# Patient Record
Sex: FEMALE | Race: WHITE | NOT HISPANIC OR LATINO | Employment: UNEMPLOYED | URBAN - METROPOLITAN AREA
[De-identification: names, ages, dates, MRNs, and addresses within clinical notes are randomized per-mention and may not be internally consistent; named-entity substitution may affect disease eponyms.]

---

## 2023-03-30 DIAGNOSIS — E78.1 HYPERTRIGLYCERIDEMIA: ICD-10-CM

## 2023-03-30 PROBLEM — R60.9 EDEMA: Status: ACTIVE | Noted: 2023-03-30

## 2023-03-30 PROBLEM — R53.83 FATIGUE: Status: ACTIVE | Noted: 2023-03-30

## 2023-03-30 PROBLEM — R03.0 ELEVATED BLOOD PRESSURE READING WITHOUT DIAGNOSIS OF HYPERTENSION: Status: ACTIVE | Noted: 2023-03-30

## 2023-03-30 PROBLEM — G45.9 TIA (TRANSIENT ISCHEMIC ATTACK): Status: ACTIVE | Noted: 2023-03-30

## 2023-03-30 RX ORDER — OMEGA-3S/DHA/EPA/FISH OIL/D3 360MG-1000
1 CAPSULE ORAL DAILY
COMMUNITY
Start: 2014-11-18

## 2023-03-30 RX ORDER — ATORVASTATIN CALCIUM 40 MG/1
40 TABLET, FILM COATED ORAL DAILY
Qty: 90 TABLET | Refills: 0 | Status: SHIPPED | OUTPATIENT
Start: 2023-03-30 | End: 2023-08-16 | Stop reason: SDUPTHER

## 2023-03-30 RX ORDER — ATORVASTATIN CALCIUM 40 MG/1
1 TABLET, FILM COATED ORAL DAILY
COMMUNITY
Start: 2014-11-18 | End: 2023-03-30 | Stop reason: SDUPTHER

## 2023-03-30 RX ORDER — MULTIVIT-MIN/IRON/FOLIC ACID/K 18-600-40
1 CAPSULE ORAL DAILY
COMMUNITY
Start: 2014-11-18

## 2023-03-30 RX ORDER — LORATADINE 10 MG/1
1 TABLET ORAL DAILY
COMMUNITY
Start: 2014-11-18

## 2023-07-07 ENCOUNTER — TELEPHONE (OUTPATIENT)
Dept: PRIMARY CARE | Facility: CLINIC | Age: 63
End: 2023-07-07
Payer: COMMERCIAL

## 2023-07-07 DIAGNOSIS — R53.83 OTHER FATIGUE: ICD-10-CM

## 2023-07-07 DIAGNOSIS — Z00.00 HEALTHCARE MAINTENANCE: ICD-10-CM

## 2023-08-07 ENCOUNTER — OFFICE VISIT (OUTPATIENT)
Dept: PRIMARY CARE | Facility: CLINIC | Age: 63
End: 2023-08-07
Payer: COMMERCIAL

## 2023-08-07 VITALS
OXYGEN SATURATION: 98 % | HEART RATE: 56 BPM | HEIGHT: 68 IN | BODY MASS INDEX: 29.86 KG/M2 | SYSTOLIC BLOOD PRESSURE: 135 MMHG | DIASTOLIC BLOOD PRESSURE: 78 MMHG | WEIGHT: 197 LBS

## 2023-08-07 DIAGNOSIS — J01.10 ACUTE NON-RECURRENT FRONTAL SINUSITIS: Primary | ICD-10-CM

## 2023-08-07 PROCEDURE — 1036F TOBACCO NON-USER: CPT | Performed by: NURSE PRACTITIONER

## 2023-08-07 PROCEDURE — 99213 OFFICE O/P EST LOW 20 MIN: CPT | Performed by: NURSE PRACTITIONER

## 2023-08-07 RX ORDER — AMOXICILLIN AND CLAVULANATE POTASSIUM 875; 125 MG/1; MG/1
875 TABLET, FILM COATED ORAL 2 TIMES DAILY
Qty: 20 TABLET | Refills: 0 | Status: SHIPPED | OUTPATIENT
Start: 2023-08-07 | End: 2023-08-16 | Stop reason: ALTCHOICE

## 2023-08-07 RX ORDER — BENZONATATE 200 MG/1
200 CAPSULE ORAL 3 TIMES DAILY PRN
Qty: 42 CAPSULE | Refills: 0 | Status: SHIPPED | OUTPATIENT
Start: 2023-08-07 | End: 2023-09-06

## 2023-08-07 RX ORDER — CALCIUM CARBONATE 500(1250)
1 TABLET ORAL
COMMUNITY

## 2023-08-07 RX ORDER — FLUTICASONE PROPIONATE 50 MCG
1 SPRAY, SUSPENSION (ML) NASAL DAILY
Qty: 16 G | Refills: 0 | Status: SHIPPED | OUTPATIENT
Start: 2023-08-07

## 2023-08-07 ASSESSMENT — ENCOUNTER SYMPTOMS
NAUSEA: 0
DIARRHEA: 0
FEVER: 0
FATIGUE: 0
VOMITING: 0
SINUS PAIN: 0
RHINORRHEA: 1
CHILLS: 0
SHORTNESS OF BREATH: 0
WHEEZING: 0
COUGH: 1
ABDOMINAL PAIN: 0
PALPITATIONS: 0
SINUS PRESSURE: 0
SORE THROAT: 1

## 2023-08-07 NOTE — ASSESSMENT & PLAN NOTE
Antibiotic sent to pharmacy  Advised patient to push fluids and start use of cool mist humidifier  Patient to start using flonase nasal spray  Tessalon perles sent to pharmacy   Patient to call if develop new or worsening symptoms

## 2023-08-07 NOTE — PROGRESS NOTES
"Subjective   Chief Complaint: Sore Throat.    HPI   Cassidy Nova is a 63 y.o. female who presents for Sore Throat.    Patient presents with 10 day history of cough and congestion,   This morning noticed white spots on the back of her throat and reports sore throat.   Cough is worse at nighttime when laying down    Patient denies fever, chills, nausea, vomiting, diarrhea, chest pain, heart palpations, or shortness of breath.     OTC has been using mucinex         Review of Systems   Constitutional:  Negative for chills, fatigue and fever.   HENT:  Positive for congestion, postnasal drip, rhinorrhea and sore throat. Negative for ear discharge, ear pain, nosebleeds, sinus pressure and sinus pain.    Respiratory:  Positive for cough. Negative for shortness of breath and wheezing.    Cardiovascular:  Negative for chest pain and palpitations.   Gastrointestinal:  Negative for abdominal pain, diarrhea, nausea and vomiting.       Objective   /78   Pulse 56   Ht 1.715 m (5' 7.5\")   Wt 89.4 kg (197 lb)   SpO2 98%   BMI 30.40 kg/m²   BSA Body surface area is 2.06 meters squared.      Physical Exam  Constitutional:       Appearance: Normal appearance.   HENT:      Right Ear: Tympanic membrane normal.      Left Ear: Tympanic membrane normal.      Mouth/Throat:      Mouth: Mucous membranes are moist.   Eyes:      Pupils: Pupils are equal, round, and reactive to light.   Cardiovascular:      Rate and Rhythm: Normal rate and regular rhythm.   Pulmonary:      Effort: Pulmonary effort is normal.      Breath sounds: Normal breath sounds.   Abdominal:      General: Abdomen is flat.      Palpations: Abdomen is soft.   Neurological:      Mental Status: She is alert.       No visits with results within 1 Year(s) from this visit.   Latest known visit with results is:   Legacy Encounter on 05/30/2018   Component Date Value Ref Range Status    Glucose 05/30/2018 103 (H)  74 - 99 mg/dL Final    Sodium 05/30/2018 140  136 - 145 " mmol/L Final    Potassium 05/30/2018 4.9  3.5 - 5.3 mmol/L Final    Chloride 05/30/2018 104  98 - 107 mmol/L Final    Bicarbonate 05/30/2018 24  21 - 32 mmol/L Final    Anion Gap 05/30/2018 17  10 - 20 mmol/L Final    Urea Nitrogen 05/30/2018 17  6 - 23 mg/dL Final    Creatinine 05/30/2018 0.83  0.50 - 1.05 mg/dL Final    GLOMERULAR FILTRATION RATE-NON AFR* 05/30/2018 >60  >60 mL/min/1.73m2 Final    GLOMERULAR FILTRATION RATE-* 05/30/2018 >60  >60 mL/min/1.73m2 Final    Comment:  CALCULATIONS OF ESTIMATED GFR ARE PERFORMED   USING THE MDRD STUDY EQUATION FOR THE   IDMS-TRACEABLE CREATININE METHODS.   CLIN CHEM 2007;53:766-72      Calcium 05/30/2018 10.0  8.6 - 10.6 mg/dL Final    Albumin 05/30/2018 4.7  3.4 - 5.0 g/dL Final    Alkaline Phosphatase 05/30/2018 77  33 - 110 U/L Final    Total Protein 05/30/2018 7.5  6.4 - 8.2 g/dL Final    AST 05/30/2018 24  9 - 39 U/L Final    Total Bilirubin 05/30/2018 1.5 (H)  0.0 - 1.2 mg/dL Final    ALT (SGPT) 05/30/2018 36  7 - 45 U/L Final    Comment:  Patients treated with Sulfasalazine may generate    falsely decreased results for ALT.      TSH 05/30/2018 2.23  0.44 - 3.98 mIU/L Final    Comment:  TSH testing is performed using different testing    methodology at [facility] than at other    [facility] hospitals. Direct result comparisons should    only be made within the same method.  .   Patients receiving more than 5 mg/day of biotin may have interference   in test results.  A sample should be taken no sooner than eight hours   after  previous dose. Contact [phone] for additional information.      Vitamin D, 25-Hydroxy 05/30/2018 35  ng/mL Final    Comment: .  DEFICIENCY:         < 20  NG/ML  INSUFFICIENCY:      20-29 NG/ML  OPTIMUM LEVEL:      30-80 NG/ML  POSSIBLE TOXICITY:  > 80  NG/ML    THIS ASSAY ACCURATELY QUANTIFIES THE SUM OF  VITAMIN D3, 25-HYDROXY AND VIT D2,25-HYDROXY.      WBC 05/30/2018 6.4  4.4 - 11.3 x10E9/L Final    nRBC 05/30/2018 0.0   0.0 - 0.0 /100 WBC Final    RBC 05/30/2018 4.50  4.00 - 5.20 x10E12/L Final    Hemoglobin 05/30/2018 13.8  12.0 - 16.0 g/dL Final    Hematocrit 05/30/2018 42.2  36.0 - 46.0 % Final    MCV 05/30/2018 94  80 - 100 fL Final    MCHC 05/30/2018 32.7  32.0 - 36.0 g/dL Final    Platelets 05/30/2018 215  150 - 450 x10E9/L Final    RDW 05/30/2018 12.9  11.5 - 14.5 % Final    Neutrophils % 05/30/2018 53.6  40.0 - 80.0 % Final    Immature Granulocytes %, Automated 05/30/2018 0.5  0.0 - 0.9 % Final    Comment:  Percent differential counts (%) should be interpreted in the   context of the absolute cell counts (cells/L).      Lymphocytes % 05/30/2018 34.3  13.0 - 44.0 % Final    Monocytes % 05/30/2018 7.7  2.0 - 10.0 % Final    Eosinophils % 05/30/2018 3.0  0.0 - 6.0 % Final    Basophils % 05/30/2018 0.9  0.0 - 2.0 % Final    Neutrophils Absolute 05/30/2018 3.42  1.20 - 7.70 x10E9/L Final    Lymphocytes Absolute 05/30/2018 2.19  1.20 - 4.80 x10E9/L Final    Monocytes Absolute 05/30/2018 0.49  0.10 - 1.00 x10E9/L Final    Eosinophils Absolute 05/30/2018 0.19  0.00 - 0.70 x10E9/L Final    Basophils Absolute 05/30/2018 0.06  0.00 - 0.10 x10E9/L Final    Cholesterol 05/30/2018 172  0 - 199 mg/dL Final    Comment: .      AGE      DESIRABLE   BORDERLINE HIGH   HIGH     0-19 Y     0 - 169       170 - 199     >/= 200    20-24 Y     0 - 189       190 - 224     >/= 225         >24 Y     0 - 199       200 - 239     >/= 240   **All ranges are based on fasting samples. Specific   therapeutic targets will vary based on patient-specific   cardiac risk.  .   Pediatric guidelines reference:Pediatrics 2011, 128(S5).   Adult guidelines reference: NCEP ATPIII Guidelines,     CHIRAG 2001, 258:2486-97  .   Venipuncture immediately after or during the    administration of Metamizole may lead to falsely   low results. Testing should be performed immediately   prior to Metamizole dosing.      HDL 05/30/2018 48.7  mg/dL Final    Comment: .      AGE       VERY LOW   LOW     NORMAL    HIGH       0-19 Y       < 35   < 40     40-45     ----    20-24 Y       ----   < 40       >45     ----      >24 Y       ----   < 40     40-60      >60  .      Cholesterol/HDL Ratio 05/30/2018 3.5   Final    Comment: REF VALUES  DESIRABLE  < 3.4  HIGH RISK  > 5.0      LDL 05/30/2018 82  0 - 99 mg/dL Final    Comment: .                           NEAR      BORD      AGE      DESIRABLE  OPTIMAL    HIGH     HIGH     VERY HIGH     0-19 Y     0 - 109     ---    110-129   >/= 130     ----    20-24 Y     0 - 119     ---    120-159   >/= 160     ----      >24 Y     0 -  99   100-129  130-159   160-189     >/=190  .      VLDL 05/30/2018 42 (H)  0 - 40 mg/dL Final    Triglycerides 05/30/2018 208 (H)  0 - 149 mg/dL Final    Comment: .      AGE      DESIRABLE   BORDERLINE HIGH   HIGH     VERY HIGH   0 D-90 D    19 - 174         ----         ----        ----  91 D- 9 Y     0 -  74        75 -  99     >/= 100      ----    10-19 Y     0 -  89        90 - 129     >/= 130      ----    20-24 Y     0 - 114       115 - 149     >/= 150      ----         >24 Y     0 - 149       150 - 199    200- 499    >/= 500  .   Venipuncture immediately after or during the    administration of Metamizole may lead to falsely   low results. Testing should be performed immediately   prior to Metamizole dosing.      Non HDL Cholesterol 05/30/2018 123  mg/dL Final    Comment:     AGE      DESIRABLE   BORDERLINE HIGH   HIGH     VERY HIGH     0-19 Y     0 - 119       120 - 144     >/= 145    >/= 160    20-24 Y     0 - 149       150 - 189     >/= 190      ----         >24 Y    30 MG/DL ABOVE LDL CHOLESTEROL GOAL  .       Current Outpatient Medications on File Prior to Visit   Medication Sig Dispense Refill    aspirin-calcium carbonate 81 mg-300 mg calcium(777 mg) tablet Take 1 tablet by mouth once daily.      atorvastatin (Lipitor) 40 mg tablet Take 1 tablet (40 mg) by mouth once daily. 90 tablet 0    calcium carbonate (Oscal) 500  mg calcium (1,250 mg) tablet Take 1 tablet (1,250 mg) by mouth 2 times a day with meals.      loratadine (Claritin) 10 mg tablet Take 1 tablet (10 mg) by mouth once daily.      multivitamin-min-iron-FA-vit K (Multi For Her) 18 mg iron-600 mcg-40 mcg capsule Take 1 capsule by mouth once daily.      omega-3s-dha-epa-fish oil-D3 (Fish Oil-Vit D3) 360 mg-1,200 mg -1,000 unit capsule Take 1 capsule by mouth once daily.       No current facility-administered medications on file prior to visit.     No images are attached to the encounter.            Assessment/Plan   Problem List Items Addressed This Visit       Acute non-recurrent frontal sinusitis - Primary     Antibiotic sent to pharmacy  Advised patient to push fluids and start use of cool mist humidifier  Patient to start using flonase nasal spray  Tessalon perles sent to pharmacy   Patient to call if develop new or worsening symptoms          Relevant Medications    amoxicillin-pot clavulanate (Augmentin) 875-125 mg tablet    fluticasone (Flonase) 50 mcg/actuation nasal spray    benzonatate (Tessalon) 200 mg capsule

## 2023-08-16 ENCOUNTER — OFFICE VISIT (OUTPATIENT)
Dept: PRIMARY CARE | Facility: CLINIC | Age: 63
End: 2023-08-16
Payer: COMMERCIAL

## 2023-08-16 VITALS
BODY MASS INDEX: 29.83 KG/M2 | WEIGHT: 196.8 LBS | DIASTOLIC BLOOD PRESSURE: 74 MMHG | HEIGHT: 68 IN | HEART RATE: 61 BPM | SYSTOLIC BLOOD PRESSURE: 128 MMHG

## 2023-08-16 DIAGNOSIS — R53.83 OTHER FATIGUE: ICD-10-CM

## 2023-08-16 DIAGNOSIS — R03.0 ELEVATED BLOOD PRESSURE READING WITHOUT DIAGNOSIS OF HYPERTENSION: ICD-10-CM

## 2023-08-16 DIAGNOSIS — Z00.00 HEALTHCARE MAINTENANCE: Primary | ICD-10-CM

## 2023-08-16 DIAGNOSIS — Z12.31 ENCOUNTER FOR SCREENING MAMMOGRAM FOR MALIGNANT NEOPLASM OF BREAST: ICD-10-CM

## 2023-08-16 DIAGNOSIS — E78.1 HYPERTRIGLYCERIDEMIA: ICD-10-CM

## 2023-08-16 DIAGNOSIS — G45.9 TIA (TRANSIENT ISCHEMIC ATTACK): ICD-10-CM

## 2023-08-16 PROCEDURE — 99396 PREV VISIT EST AGE 40-64: CPT | Performed by: FAMILY MEDICINE

## 2023-08-16 PROCEDURE — 1036F TOBACCO NON-USER: CPT | Performed by: FAMILY MEDICINE

## 2023-08-16 RX ORDER — ATORVASTATIN CALCIUM 40 MG/1
40 TABLET, FILM COATED ORAL DAILY
Qty: 90 TABLET | Refills: 3 | Status: SHIPPED | OUTPATIENT
Start: 2023-08-16 | End: 2023-10-06

## 2023-08-16 RX ORDER — ASPIRIN 81 MG/1
81 TABLET ORAL DAILY
COMMUNITY

## 2023-08-16 ASSESSMENT — ENCOUNTER SYMPTOMS
DIZZINESS: 0
HEADACHES: 0
ARTHRALGIAS: 0
FACIAL ASYMMETRY: 0
FEVER: 0
FATIGUE: 0
LIGHT-HEADEDNESS: 0
CHEST TIGHTNESS: 0
BACK PAIN: 0
CHOKING: 0
COLOR CHANGE: 0
PALPITATIONS: 0
COUGH: 0
APPETITE CHANGE: 0
ACTIVITY CHANGE: 0

## 2023-08-16 NOTE — PROGRESS NOTES
"Subjective   Patient ID: Cassidy Nova is a 63 y.o. female who presents for Annual Exam.    HPI   Patient presents for physical exam no pap until .     Fam Hx  Mom (62) , COPD,   Dad (85) living, Alzheimer's, DMII, bradycardia     Exercise walks, rides a bike 3 days a week  ETOH denies  Caffeine 1-2 tea/day, 12 oz coffee/day  Tobacco denies     Mammogram due   DEXA @ 67  Colonoscopy Dr. Moralez 2013 due      Past Med Hx  TIA      Past Surg Hx  cholecystectomy  tonsillectomy  cyst on back      Patient denies other complaints.  Review of Systems   Constitutional:  Negative for activity change, appetite change, fatigue and fever.   HENT:  Negative for congestion.    Respiratory:  Negative for cough, choking and chest tightness.    Cardiovascular:  Negative for chest pain, palpitations and leg swelling.   Musculoskeletal:  Negative for arthralgias, back pain and gait problem.   Skin:  Negative for color change and pallor.   Neurological:  Negative for dizziness, facial asymmetry, light-headedness and headaches.       Objective   /82 (BP Location: Left arm)   Pulse 61   Ht 1.715 m (5' 7.5\")   Wt 89.3 kg (196 lb 12.8 oz)   BMI 30.37 kg/m²   BSA Body surface area is 2.06 meters squared.      Physical Exam  Constitutional:       General: She is not in acute distress.     Appearance: Normal appearance. She is not toxic-appearing.   HENT:      Head: Normocephalic.      Right Ear: Tympanic membrane, ear canal and external ear normal.      Left Ear: Tympanic membrane, ear canal and external ear normal.      Nose: Nose normal.      Mouth/Throat:      Pharynx: Oropharynx is clear.   Eyes:      Conjunctiva/sclera: Conjunctivae normal.      Pupils: Pupils are equal, round, and reactive to light.   Cardiovascular:      Rate and Rhythm: Normal rate and regular rhythm.      Pulses: Normal pulses.      Heart sounds: Normal heart sounds.   Pulmonary:      Effort: No respiratory distress.      Breath " sounds: No wheezing, rhonchi or rales.   Abdominal:      General: Bowel sounds are normal. There is no distension.      Palpations: Abdomen is soft.      Tenderness: There is no abdominal tenderness.   Musculoskeletal:         General: No swelling or tenderness.      Cervical back: No tenderness.   Skin:     Findings: No lesion or rash.   Neurological:      General: No focal deficit present.      Mental Status: She is alert and oriented to person, place, and time. Mental status is at baseline.      Gait: Gait normal.   Psychiatric:         Mood and Affect: Mood normal.         Behavior: Behavior normal.         Thought Content: Thought content normal.         Judgment: Judgment normal.       No visits with results within 1 Year(s) from this visit.   Latest known visit with results is:   Legacy Encounter on 05/30/2018   Component Date Value Ref Range Status    Glucose 05/30/2018 103 (H)  74 - 99 mg/dL Final    Sodium 05/30/2018 140  136 - 145 mmol/L Final    Potassium 05/30/2018 4.9  3.5 - 5.3 mmol/L Final    Chloride 05/30/2018 104  98 - 107 mmol/L Final    Bicarbonate 05/30/2018 24  21 - 32 mmol/L Final    Anion Gap 05/30/2018 17  10 - 20 mmol/L Final    Urea Nitrogen 05/30/2018 17  6 - 23 mg/dL Final    Creatinine 05/30/2018 0.83  0.50 - 1.05 mg/dL Final    GLOMERULAR FILTRATION RATE-NON AFR* 05/30/2018 >60  >60 mL/min/1.73m2 Final    GLOMERULAR FILTRATION RATE-* 05/30/2018 >60  >60 mL/min/1.73m2 Final    Comment:  CALCULATIONS OF ESTIMATED GFR ARE PERFORMED   USING THE MDRD STUDY EQUATION FOR THE   IDMS-TRACEABLE CREATININE METHODS.   CLIN CHEM 2007;53:766-72      Calcium 05/30/2018 10.0  8.6 - 10.6 mg/dL Final    Albumin 05/30/2018 4.7  3.4 - 5.0 g/dL Final    Alkaline Phosphatase 05/30/2018 77  33 - 110 U/L Final    Total Protein 05/30/2018 7.5  6.4 - 8.2 g/dL Final    AST 05/30/2018 24  9 - 39 U/L Final    Total Bilirubin 05/30/2018 1.5 (H)  0.0 - 1.2 mg/dL Final    ALT (SGPT) 05/30/2018 36  7 - 45 U/L  Final    Comment:  Patients treated with Sulfasalazine may generate    falsely decreased results for ALT.      TSH 05/30/2018 2.23  0.44 - 3.98 mIU/L Final    Comment:  TSH testing is performed using different testing    methodology at [facility] than at other    [facility]. Direct result comparisons should    only be made within the same method.  .   Patients receiving more than 5 mg/day of biotin may have interference   in test results.  A sample should be taken no sooner than eight hours   after  previous dose. Contact [phone] for additional information.      Vitamin D, 25-Hydroxy 05/30/2018 35  ng/mL Final    Comment: .  DEFICIENCY:         < 20  NG/ML  INSUFFICIENCY:      20-29 NG/ML  OPTIMUM LEVEL:      30-80 NG/ML  POSSIBLE TOXICITY:  > 80  NG/ML    THIS ASSAY ACCURATELY QUANTIFIES THE SUM OF  VITAMIN D3, 25-HYDROXY AND VIT D2,25-HYDROXY.      WBC 05/30/2018 6.4  4.4 - 11.3 x10E9/L Final    nRBC 05/30/2018 0.0  0.0 - 0.0 /100 WBC Final    RBC 05/30/2018 4.50  4.00 - 5.20 x10E12/L Final    Hemoglobin 05/30/2018 13.8  12.0 - 16.0 g/dL Final    Hematocrit 05/30/2018 42.2  36.0 - 46.0 % Final    MCV 05/30/2018 94  80 - 100 fL Final    MCHC 05/30/2018 32.7  32.0 - 36.0 g/dL Final    Platelets 05/30/2018 215  150 - 450 x10E9/L Final    RDW 05/30/2018 12.9  11.5 - 14.5 % Final    Neutrophils % 05/30/2018 53.6  40.0 - 80.0 % Final    Immature Granulocytes %, Automated 05/30/2018 0.5  0.0 - 0.9 % Final    Comment:  Percent differential counts (%) should be interpreted in the   context of the absolute cell counts (cells/L).      Lymphocytes % 05/30/2018 34.3  13.0 - 44.0 % Final    Monocytes % 05/30/2018 7.7  2.0 - 10.0 % Final    Eosinophils % 05/30/2018 3.0  0.0 - 6.0 % Final    Basophils % 05/30/2018 0.9  0.0 - 2.0 % Final    Neutrophils Absolute 05/30/2018 3.42  1.20 - 7.70 x10E9/L Final    Lymphocytes Absolute 05/30/2018 2.19  1.20 - 4.80 x10E9/L Final    Monocytes Absolute 05/30/2018 0.49   0.10 - 1.00 x10E9/L Final    Eosinophils Absolute 05/30/2018 0.19  0.00 - 0.70 x10E9/L Final    Basophils Absolute 05/30/2018 0.06  0.00 - 0.10 x10E9/L Final    Cholesterol 05/30/2018 172  0 - 199 mg/dL Final    Comment: .      AGE      DESIRABLE   BORDERLINE HIGH   HIGH     0-19 Y     0 - 169       170 - 199     >/= 200    20-24 Y     0 - 189       190 - 224     >/= 225         >24 Y     0 - 199       200 - 239     >/= 240   **All ranges are based on fasting samples. Specific   therapeutic targets will vary based on patient-specific   cardiac risk.  .   Pediatric guidelines reference:Pediatrics 2011, 128(S5).   Adult guidelines reference: NCEP ATPIII Guidelines,     CHIRAG 2001, 258:2486-97  .   Venipuncture immediately after or during the    administration of Metamizole may lead to falsely   low results. Testing should be performed immediately   prior to Metamizole dosing.      HDL 05/30/2018 48.7  mg/dL Final    Comment: .      AGE      VERY LOW   LOW     NORMAL    HIGH       0-19 Y       < 35   < 40     40-45     ----    20-24 Y       ----   < 40       >45     ----      >24 Y       ----   < 40     40-60      >60  .      Cholesterol/HDL Ratio 05/30/2018 3.5   Final    Comment: REF VALUES  DESIRABLE  < 3.4  HIGH RISK  > 5.0      LDL 05/30/2018 82  0 - 99 mg/dL Final    Comment: .                           NEAR      BORD      AGE      DESIRABLE  OPTIMAL    HIGH     HIGH     VERY HIGH     0-19 Y     0 - 109     ---    110-129   >/= 130     ----    20-24 Y     0 - 119     ---    120-159   >/= 160     ----      >24 Y     0 -  99   100-129  130-159   160-189     >/=190  .      VLDL 05/30/2018 42 (H)  0 - 40 mg/dL Final    Triglycerides 05/30/2018 208 (H)  0 - 149 mg/dL Final    Comment: .      AGE      DESIRABLE   BORDERLINE HIGH   HIGH     VERY HIGH   0 D-90 D    19 - 174         ----         ----        ----  91 D- 9 Y     0 -  74        75 -  99     >/= 100      ----    10-19 Y     0 -  89        90 - 129     >/= 130       ----    20-24 Y     0 - 114       115 - 149     >/= 150      ----         >24 Y     0 - 149       150 - 199    200- 499    >/= 500  .   Venipuncture immediately after or during the    administration of Metamizole may lead to falsely   low results. Testing should be performed immediately   prior to Metamizole dosing.      Non HDL Cholesterol 05/30/2018 123  mg/dL Final    Comment:     AGE      DESIRABLE   BORDERLINE HIGH   HIGH     VERY HIGH     0-19 Y     0 - 119       120 - 144     >/= 145    >/= 160    20-24 Y     0 - 149       150 - 189     >/= 190      ----         >24 Y    30 MG/DL ABOVE LDL CHOLESTEROL GOAL  .       Current Outpatient Medications on File Prior to Visit   Medication Sig Dispense Refill    aspirin 81 mg EC tablet Take 1 tablet (81 mg) by mouth once daily.      atorvastatin (Lipitor) 40 mg tablet Take 1 tablet (40 mg) by mouth once daily. 90 tablet 0    benzonatate (Tessalon) 200 mg capsule Take 1 capsule (200 mg) by mouth 3 times a day as needed for cough. Do not crush or chew. 42 capsule 0    calcium carbonate (Oscal) 500 mg calcium (1,250 mg) tablet Take 1 tablet (1,250 mg) by mouth 2 times a day with meals.      fluticasone (Flonase) 50 mcg/actuation nasal spray Administer 1 spray into each nostril once daily. Shake gently. Before first use, prime pump. After use, clean tip and replace cap. 16 g 0    L.rhamnosus/B.animalis,lactis, ([employer] ORAL) Take 1 tablet by mouth once daily.      loratadine (Claritin) 10 mg tablet Take 1 tablet (10 mg) by mouth once daily.      multivitamin-min-iron-FA-vit K (Multi For Her) 18 mg iron-600 mcg-40 mcg capsule Take 1 capsule by mouth once daily.      omega-3s-dha-epa-fish oil-D3 (Fish Oil-Vit D3) 360 mg-1,200 mg -1,000 unit capsule Take 1 capsule by mouth once daily.      [DISCONTINUED] amoxicillin-pot clavulanate (Augmentin) 875-125 mg tablet Take 1 tablet (875 mg) by mouth 2 times a day for 10 days. 20 tablet 0    [DISCONTINUED]  aspirin-calcium carbonate 81 mg-300 mg calcium(777 mg) tablet Take 1 tablet by mouth once daily.       No current facility-administered medications on file prior to visit.     No images are attached to the encounter.            Assessment/Plan   Diagnoses and all orders for this visit:  Healthcare maintenance  Elevated blood pressure reading without diagnosis of hypertension  TIA (transient ischemic attack)  Other fatigue  1. Patient's blood work discussed at this office visit  2. Patient's LDL goal <130, LDL 82  3. Patient's TG goal <150, current , continue on TYPE IV diet  4. Mammogram due 2023  5. DEXA @ 67  6. Colonoscopy Dr. Moralez 2013 due 2023  7. Patient to call questions or concerns

## 2023-10-05 DIAGNOSIS — E78.1 HYPERTRIGLYCERIDEMIA: ICD-10-CM

## 2023-10-06 RX ORDER — ATORVASTATIN CALCIUM 40 MG/1
40 TABLET, FILM COATED ORAL DAILY
Qty: 90 TABLET | Refills: 0 | Status: SHIPPED | OUTPATIENT
Start: 2023-10-06

## 2023-11-24 ENCOUNTER — OFFICE VISIT (OUTPATIENT)
Dept: PRIMARY CARE | Facility: CLINIC | Age: 63
End: 2023-11-24
Payer: COMMERCIAL

## 2023-11-24 VITALS
BODY MASS INDEX: 29.86 KG/M2 | OXYGEN SATURATION: 98 % | HEART RATE: 64 BPM | TEMPERATURE: 96.5 F | HEIGHT: 68 IN | DIASTOLIC BLOOD PRESSURE: 82 MMHG | SYSTOLIC BLOOD PRESSURE: 144 MMHG | WEIGHT: 197 LBS

## 2023-11-24 DIAGNOSIS — R03.0 ELEVATED BLOOD PRESSURE READING WITHOUT DIAGNOSIS OF HYPERTENSION: Primary | ICD-10-CM

## 2023-11-24 PROCEDURE — 1036F TOBACCO NON-USER: CPT | Performed by: FAMILY MEDICINE

## 2023-11-24 PROCEDURE — 99214 OFFICE O/P EST MOD 30 MIN: CPT | Performed by: FAMILY MEDICINE

## 2023-11-24 ASSESSMENT — ENCOUNTER SYMPTOMS
CHOKING: 0
LIGHT-HEADEDNESS: 0
APPETITE CHANGE: 0
FACIAL ASYMMETRY: 0
ACTIVITY CHANGE: 0
PALPITATIONS: 0
FEVER: 0
DIZZINESS: 0
COUGH: 0
COLOR CHANGE: 0
ARTHRALGIAS: 0
CHEST TIGHTNESS: 0
FATIGUE: 0
HYPERTENSION: 1
BACK PAIN: 0
HEADACHES: 1

## 2023-11-24 ASSESSMENT — PATIENT HEALTH QUESTIONNAIRE - PHQ9
SUM OF ALL RESPONSES TO PHQ9 QUESTIONS 1 AND 2: 0
1. LITTLE INTEREST OR PLEASURE IN DOING THINGS: NOT AT ALL
2. FEELING DOWN, DEPRESSED OR HOPELESS: NOT AT ALL

## 2023-11-24 NOTE — PROGRESS NOTES
"Subjective   Patient ID: Cassidy Nova is a 63 y.o. female who presents for Hypertension.    Hypertension  Associated symptoms include headaches. Pertinent negatives include no chest pain or palpitations.   Patient reports that her blood pressure has been waking her up in the middle of the night.  She is taking it at home in the middle of the night. She usually gets 162/88, 161/84 and tried to do deep breathing exercises. She did have a stroke back in 2012 and has had minimal sodium since them.     Review of Systems   Constitutional:  Negative for activity change, appetite change, fatigue and fever.   HENT:  Negative for congestion.    Respiratory:  Negative for cough, choking and chest tightness.    Cardiovascular:  Negative for chest pain, palpitations and leg swelling.   Musculoskeletal:  Negative for arthralgias, back pain and gait problem.   Skin:  Negative for color change and pallor.   Neurological:  Positive for headaches. Negative for dizziness, facial asymmetry and light-headedness.       Objective   /82   Pulse 64   Temp 35.8 °C (96.5 °F)   Ht 1.715 m (5' 7.5\")   Wt 89.4 kg (197 lb)   SpO2 98%   BMI 30.40 kg/m²   BSA Body surface area is 2.06 meters squared.      Physical Exam  Constitutional:       General: She is not in acute distress.     Appearance: Normal appearance. She is not toxic-appearing.   HENT:      Head: Normocephalic.      Right Ear: Tympanic membrane, ear canal and external ear normal.      Left Ear: Tympanic membrane, ear canal and external ear normal.   Eyes:      Conjunctiva/sclera: Conjunctivae normal.      Pupils: Pupils are equal, round, and reactive to light.   Cardiovascular:      Rate and Rhythm: Normal rate and regular rhythm.      Pulses: Normal pulses.      Heart sounds: Normal heart sounds.   Pulmonary:      Effort: No respiratory distress.      Breath sounds: No wheezing, rhonchi or rales.   Musculoskeletal:         General: No swelling or tenderness.   Skin:     " Findings: No lesion or rash.   Neurological:      General: No focal deficit present.      Mental Status: She is alert and oriented to person, place, and time. Mental status is at baseline.      Gait: Gait normal.   Psychiatric:         Mood and Affect: Mood normal.         Behavior: Behavior normal.         Thought Content: Thought content normal.         Judgment: Judgment normal.       No visits with results within 1 Year(s) from this visit.   Latest known visit with results is:   Legacy Encounter on 01/25/2022   Component Date Value Ref Range Status    Pathology Report 01/25/2022    Final                    Value:    Accession #: [id number]     Date of Procedure:  1/25/2022       Pathologist: [facility], Cytology  Date Reported: 2/1/2022  Date Received:  1/25/2022  Submitting Physician: TERI YU D.O.                    FINAL CYTOLOGICAL INTERPRETATION        A.  THINPREP PAP CERVICAL:              Specimen adequacy:       SATISFACTORY FOR EVALUATION.       Quality Indicator: Endocervical/transformation zone component is present.              General Categorization:       NEGATIVE FOR INTRAEPITHELIAL LESION OR MALIGNANCY.              Descriptive Interpretation:       CELLULAR CHANGES CONSISTENT WITH ATROPHY.                            HIGH RISK HPV TEST RESULT:                       HPV GENOTYPE  16                      NEGATIVE       HPV GENOTYPE  18                      NEGATIVE       HPV GENOTYPE  OTHER             NEGATIVE              Reference Range: Negative                  Slide(s) initially screened by a Cytotechnologist at [facility], [address] [place] [address]    Testing for high-risk (HR) type of human papilloma virus (HPV) is performed by  the Roche gaby HPV Test.  The gaby HPV Test is a qualitative polymerase chain  reaction that amplifies DNA of HPV16, HPV18 and 12 other high-risk HPV types  (31,  33, 35, 39, 45, 51, 52, 56, 58, 59, 66, and 68) associated with cervical  cancer and its precursor lesions.  A positive result indicates the presence of  HPV DNA due to one or more of the 14 genotypes: 16, 18, 31, 33, 35, 39, 45, 51,  52, 56, 58, 59, 66, and 68. Negative results indicate HPV DNA concentrations  are undetectable or below the pre-set threshold for detection. False negative  results may be associated with unoptimized sampling. A negative HR HPV result  does not exclude the possibility of future cytologic HSIL or underlying CIN2-3  or cancer.    This test is approved for cervical specimens by  the US Food and Drug  Administration. Results of this test should be interpreted in co                          njunction with  the patient's Pap test results.  Please refer to ASCCP current guidelines for  the use of HPV DNA testing, result interpretation, and patient management.   The performance of this test was verified by the Molecular Diagnostic  Laboratory at [facility]. The lab is  certified under the Clinical Laboratory Amendments of 1988 (CLIA 88) as  qualified to perform high complexity clinical laboratory testing.    This specimen has been analyzed by the [employer] PrecisionPrep Imaging System ([employer]  an automated imaging and review system, which assists the laboratory in  evaluating cells on ThinPrep Pap tests. Following automated imaging, selected  fields from every slide were reviewed by a cytotechnologist and/or pathologist.    Electronically Signed Out By [facility], Cytology//JMD   By the signature on this report, the individual or group listed as making the  Final Interpretation/Diagnosis certifies that they have reviewed th                          is case.  Educational Note:  Cervical cytology is a screening procedure primarily for squamous cancers and  precursors and has associated false-negative and false-positive results as  evidenced by  published data.  Your patient's test should be interpreted in this  context, together with patient's history and clinical findings.  Regular  sampling and follow-up of unexplained clinical signs and symptoms are  recommended to minimize false negative results.         Clinical History  Date of Last Menstrual Period:     2012    Other Clinical Conditions:  COTEST HPV(Genotype) except for ASC-H, HSIL, Carcinoma - Include HPV Genotype  testing    Clinical Diagnosis History: Pap smear for cervical cancer screening - (Z12.4)   Source of Specimen  A: THINPREP PAP CERVICAL            [facility]  Department of Pathology   [address] [place], [place] [id number]        CONVERTED FINAL DIAGNOSIS 01/25/2022    Final                    Value:A.  THINPREP PAP CERVICAL:              Specimen adequacy:       SATISFACTORY FOR EVALUATION.       Quality Indicator: Endocervical/transformation zone component is present.              General Categorization:       NEGATIVE FOR INTRAEPITHELIAL LESION OR MALIGNANCY.              Descriptive Interpretation:       CELLULAR CHANGES CONSISTENT WITH ATROPHY.                            HIGH RISK HPV TEST RESULT:                       HPV GENOTYPE  16                      NEGATIVE       HPV GENOTYPE  18                      NEGATIVE       HPV GENOTYPE  OTHER             NEGATIVE              Reference Range: Negative                    CONVERTED CLINICAL DIAGNOSIS-HISTO* 01/25/2022 Clinical Diagnosis History: Pap smear for cervical cancer screening - (Z12.4)   Final    CONVERTED DIAGNOSIS COMMENT 01/25/2022    Final                    Value:Slide(s) initially screened by a Cytotechnologist at [facility], [address] [place] [id number]    Testing for high-risk (HR) type of human papilloma virus (HPV) is performed by  the Roche gaby HPV Test.  The gaby HPV Test is a qualitative polymerase chain  reaction that amplifies DNA  of HPV16, HPV18 and 12 other high-risk HPV types  (31, 33, 35, 39, 45, 51, 52, 56, 58, 59, 66, and 68) associated with cervical  cancer and its precursor lesions.  A positive result indicates the presence of  HPV DNA due to one or more of the 14 genotypes: 16, 18, 31, 33, 35, 39, 45, 51,  52, 56, 58, 59, 66, and 68. Negative results indicate HPV DNA concentrations  are undetectable or below the pre-set threshold for detection. False negative  results may be associated with unoptimized sampling. A negative HR HPV result  does not exclude the possibility of future cytologic HSIL or underlying CIN2-3  or cancer.    This test is approved for cervical specimens by  the [place]                          [place] Food and Drug  Administration. Results of this test should be interpreted in conjunction with  the patient's Pap test results.  Please refer to ASCCP current guidelines for  the use of HPV DNA testing, result interpretation, and patient management.   The performance of this test was verified by the Molecular Diagnostic  Laboratory at [facility]. The lab is  certified under the Clinical Laboratory Amendments of 1988 (CLIA 88) as  qualified to perform high complexity clinical laboratory testing.    This specimen has been analyzed by the ThinPrep Imaging System ([employer]  an automated imaging and review system, which assists the laboratory in  evaluating cells on ThinPrep Pap tests. Following automated imaging, selected  fields from every slide were reviewed by a cytotechnologist and/or pathologist.      CONVERTED FINAL REPORT PDF LINK TO* 01/25/2022 \\copathshare\copath\PDF 2022_[id number]   Final     Current Outpatient Medications on File Prior to Visit   Medication Sig Dispense Refill    aspirin 81 mg EC tablet Take 1 tablet (81 mg) by mouth once daily.      atorvastatin (Lipitor) 40 mg tablet TAKE 1 TABLET BY MOUTH DAILY 90 tablet 0    calcium carbonate (Oscal) 500 mg calcium  (1,250 mg) tablet Take 1 tablet (1,250 mg) by mouth 2 times a day with meals.      fluticasone (Flonase) 50 mcg/actuation nasal spray Administer 1 spray into each nostril once daily. Shake gently. Before first use, prime pump. After use, clean tip and replace cap. 16 g 0    L.rhamnosus/B.animalis,lactis, ([employer] ORAL) Take 1 tablet by mouth once daily.      loratadine (Claritin) 10 mg tablet Take 1 tablet (10 mg) by mouth once daily.      multivitamin-min-iron-FA-vit K (Multi For Her) 18 mg iron-600 mcg-40 mcg capsule Take 1 capsule by mouth once daily.      omega-3s-dha-epa-fish oil-D3 (Fish Oil-Vit D3) 360 mg-1,200 mg -1,000 unit capsule Take 1 capsule by mouth once daily.       No current facility-administered medications on file prior to visit.     No images are attached to the encounter.            Assessment/Plan   Diagnoses and all orders for this visit:  Elevated blood pressure reading without diagnosis of hypertension    Patient to start on no salt diet, dash diet  2.   Patient to call for questions or concerns

## 2023-12-11 ASSESSMENT — ENCOUNTER SYMPTOMS
HEADACHES: 0
NECK PAIN: 0
SHORTNESS OF BREATH: 0
BLURRED VISION: 0
SWEATS: 0
PND: 1
ORTHOPNEA: 0
HYPERTENSION: 1
PALPITATIONS: 0

## 2023-12-13 ENCOUNTER — OFFICE VISIT (OUTPATIENT)
Dept: PRIMARY CARE | Facility: CLINIC | Age: 63
End: 2023-12-13
Payer: COMMERCIAL

## 2023-12-13 VITALS
HEART RATE: 67 BPM | WEIGHT: 192.4 LBS | BODY MASS INDEX: 29.69 KG/M2 | DIASTOLIC BLOOD PRESSURE: 70 MMHG | TEMPERATURE: 97.5 F | SYSTOLIC BLOOD PRESSURE: 132 MMHG

## 2023-12-13 DIAGNOSIS — R03.0 ELEVATED BLOOD PRESSURE READING WITHOUT DIAGNOSIS OF HYPERTENSION: Primary | ICD-10-CM

## 2023-12-13 DIAGNOSIS — J20.9 ACUTE BRONCHITIS, UNSPECIFIED ORGANISM: ICD-10-CM

## 2023-12-13 PROCEDURE — 99214 OFFICE O/P EST MOD 30 MIN: CPT | Performed by: FAMILY MEDICINE

## 2023-12-13 PROCEDURE — 1036F TOBACCO NON-USER: CPT | Performed by: FAMILY MEDICINE

## 2023-12-13 RX ORDER — AMOXICILLIN 875 MG/1
875 TABLET, FILM COATED ORAL 2 TIMES DAILY
Qty: 20 TABLET | Refills: 0 | Status: SHIPPED | OUTPATIENT
Start: 2023-12-13 | End: 2023-12-13 | Stop reason: SDUPTHER

## 2023-12-13 RX ORDER — AMOXICILLIN 875 MG/1
875 TABLET, FILM COATED ORAL 2 TIMES DAILY
Qty: 20 TABLET | Refills: 1 | Status: SHIPPED | OUTPATIENT
Start: 2023-12-13 | End: 2023-12-23

## 2023-12-13 ASSESSMENT — ENCOUNTER SYMPTOMS
DIZZINESS: 0
PND: 1
SHORTNESS OF BREATH: 0
PALPITATIONS: 0
SWEATS: 0
FATIGUE: 0
COUGH: 1
LIGHT-HEADEDNESS: 0
CHEST TIGHTNESS: 0
HEADACHES: 1
HYPERTENSION: 1
APPETITE CHANGE: 0
NECK PAIN: 0
ACTIVITY CHANGE: 0
BACK PAIN: 0
FACIAL ASYMMETRY: 0
BLURRED VISION: 0
ORTHOPNEA: 0
FEVER: 0
ARTHRALGIAS: 0
CHOKING: 0
COLOR CHANGE: 0

## 2023-12-13 NOTE — PROGRESS NOTES
Subjective   Patient ID: Cassidy Nova is a 63 y.o. female who presents for Follow-up (BP. ) and Cough (With stuffy nose. X 2 weeks. Did not check for covid.).    Hypertension  This is a recurrent problem. The current episode started more than 1 year ago. The problem has been gradually improving since onset. The problem is uncontrolled. Associated symptoms include headaches and PND. Pertinent negatives include no anxiety, blurred vision, chest pain, malaise/fatigue, neck pain, orthopnea, palpitations, peripheral edema, shortness of breath or sweats. Agents associated with hypertension include decongestants. Risk factors for coronary artery disease include family history. Compliance problems include diet and exercise.    Patient reports that her blood pressure has been waking her up in the middle of the night but much better since her bps have gone down a bit. She is getting   She is taking it at home in the middle of the night. She usually gets 120-130/70-80's. She did have a stroke back in 2012 and has had minimal sodium since them. She is trying to walk or ride bike 20 minutes to 30 minutes.     Review of Systems   Constitutional:  Negative for activity change, appetite change, fatigue, fever and malaise/fatigue.   HENT:  Negative for congestion.    Eyes:  Negative for blurred vision.   Respiratory:  Positive for cough. Negative for choking, chest tightness and shortness of breath.    Cardiovascular:  Positive for PND. Negative for chest pain, palpitations, orthopnea and leg swelling.   Musculoskeletal:  Negative for arthralgias, back pain, gait problem and neck pain.   Skin:  Negative for color change and pallor.   Neurological:  Positive for headaches. Negative for dizziness, facial asymmetry and light-headedness.       Objective   /70 (BP Location: Right arm)   Pulse 67   Temp 36.4 °C (97.5 °F)   Wt 87.3 kg (192 lb 6.4 oz)   BMI 29.69 kg/m²   BSA Body surface area is 2.04 meters squared.      Physical  Exam  Constitutional:       General: She is not in acute distress.     Appearance: Normal appearance. She is not toxic-appearing.   HENT:      Head: Normocephalic.      Right Ear: Tympanic membrane, ear canal and external ear normal.      Left Ear: Tympanic membrane, ear canal and external ear normal.   Eyes:      Conjunctiva/sclera: Conjunctivae normal.      Pupils: Pupils are equal, round, and reactive to light.   Cardiovascular:      Rate and Rhythm: Normal rate and regular rhythm.      Pulses: Normal pulses.      Heart sounds: Normal heart sounds.   Pulmonary:      Effort: No respiratory distress.      Breath sounds: No wheezing, rhonchi or rales.   Musculoskeletal:         General: No swelling or tenderness.   Skin:     Findings: No lesion or rash.   Neurological:      General: No focal deficit present.      Mental Status: She is alert and oriented to person, place, and time. Mental status is at baseline.      Gait: Gait normal.   Psychiatric:         Mood and Affect: Mood normal.         Behavior: Behavior normal.         Thought Content: Thought content normal.         Judgment: Judgment normal.       No visits with results within 1 Year(s) from this visit.   Latest known visit with results is:   Legacy Encounter on 01/25/2022   Component Date Value Ref Range Status    Pathology Report 01/25/2022    Final                    Value:    Accession #: [id number]     Date of Procedure:  1/25/2022       Pathologist: [facility], Cytology  Date Reported: 2/1/2022  Date Received:  1/25/2022  Submitting Physician: TERI YU D.O.                    FINAL CYTOLOGICAL INTERPRETATION        A.  THINPREP PAP CERVICAL:              Specimen adequacy:       SATISFACTORY FOR EVALUATION.       Quality Indicator: Endocervical/transformation zone component is present.              General Categorization:       NEGATIVE FOR INTRAEPITHELIAL LESION OR MALIGNANCY.              Descriptive  Interpretation:       CELLULAR CHANGES CONSISTENT WITH ATROPHY.                            HIGH RISK HPV TEST RESULT:                       HPV GENOTYPE  16                      NEGATIVE       HPV GENOTYPE  18                      NEGATIVE       HPV GENOTYPE  OTHER             NEGATIVE              Reference Range: Negative                  Slide(s) initially screened by a Cytotechnologist at [facility], [address] [place] [address]    Testing for high-risk (HR) type of human papilloma virus (HPV) is performed by  the Roche gaby HPV Test.  The gaby HPV Test is a qualitative polymerase chain  reaction that amplifies DNA of HPV16, HPV18 and 12 other high-risk HPV types  (31, 33, 35, 39, 45, 51, 52, 56, 58, 59, 66, and 68) associated with cervical  cancer and its precursor lesions.  A positive result indicates the presence of  HPV DNA due to one or more of the 14 genotypes: 16, 18, 31, 33, 35, 39, 45, 51,  52, 56, 58, 59, 66, and 68. Negative results indicate HPV DNA concentrations  are undetectable or below the pre-set threshold for detection. False negative  results may be associated with unoptimized sampling. A negative HR HPV result  does not exclude the possibility of future cytologic HSIL or underlying CIN2-3  or cancer.    This test is approved for cervical specimens by  the US Food and Drug  Administration. Results of this test should be interpreted in co                          njunction with  the patient's Pap test results.  Please refer to ASCCP current guidelines for  the use of HPV DNA testing, result interpretation, and patient management.   The performance of this test was verified by the Molecular Diagnostic  Laboratory at [facility]. The lab is  certified under the Clinical Laboratory Amendments of 1988 (CLIA 88) as  qualified to perform high complexity clinical laboratory testing.    This specimen  has been analyzed by the NexSteppePrep Imaging System ([employer]  an automated imaging and review system, which assists the laboratory in  evaluating cells on ThinPrep Pap tests. Following automated imaging, selected  fields from every slide were reviewed by a cytotechnologist and/or pathologist.    Electronically Signed Out By [facility], Cytology//JMD   By the signature on this report, the individual or group listed as making the  Final Interpretation/Diagnosis certifies that they have reviewed th                          is case.  Educational Note:  Cervical cytology is a screening procedure primarily for squamous cancers and  precursors and has associated false-negative and false-positive results as  evidenced by published data.  Your patient's test should be interpreted in this  context, together with patient's history and clinical findings.  Regular  sampling and follow-up of unexplained clinical signs and symptoms are  recommended to minimize false negative results.         Clinical History  Date of Last Menstrual Period:     2012    Other Clinical Conditions:  COTEST HPV(Genotype) except for ASC-H, HSIL, Carcinoma - Include HPV Genotype  testing    Clinical Diagnosis History: Pap smear for cervical cancer screening - (Z12.4)   Source of Specimen  A: THINPREP PAP CERVICAL            [facility]  Department of Pathology   [address] [place] [address]        CONVERTED FINAL DIAGNOSIS 01/25/2022    Final                    Value:A.  THINPREP PAP CERVICAL:              Specimen adequacy:       SATISFACTORY FOR EVALUATION.       Quality Indicator: Endocervical/transformation zone component is present.              General Categorization:       NEGATIVE FOR INTRAEPITHELIAL LESION OR MALIGNANCY.              Descriptive Interpretation:       CELLULAR CHANGES CONSISTENT WITH ATROPHY.                            HIGH RISK HPV TEST RESULT:                        HPV GENOTYPE  16                      NEGATIVE       HPV GENOTYPE  18                      NEGATIVE       HPV GENOTYPE  OTHER             NEGATIVE              Reference Range: Negative                    CONVERTED CLINICAL DIAGNOSIS-HISTO* 01/25/2022 Clinical Diagnosis History: Pap smear for cervical cancer screening - (Z12.4)   Final    CONVERTED DIAGNOSIS COMMENT 01/25/2022    Final                    Value:Slide(s) initially screened by a Cytotechnologist at [facility], [address] [place] [address]    Testing for high-risk (HR) type of human papilloma virus (HPV) is performed by  the Roche gaby HPV Test.  The gaby HPV Test is a qualitative polymerase chain  reaction that amplifies DNA of HPV16, HPV18 and 12 other high-risk HPV types  (31, 33, 35, 39, 45, 51, 52, 56, 58, 59, 66, and 68) associated with cervical  cancer and its precursor lesions.  A positive result indicates the presence of  HPV DNA due to one or more of the 14 genotypes: 16, 18, 31, 33, 35, 39, 45, 51,  52, 56, 58, 59, 66, and 68. Negative results indicate HPV DNA concentrations  are undetectable or below the pre-set threshold for detection. False negative  results may be associated with unoptimized sampling. A negative HR HPV result  does not exclude the possibility of future cytologic HSIL or underlying CIN2-3  or cancer.    This test is approved for cervical specimens by  the [place]                          [place] Food and Drug  Administration. Results of this test should be interpreted in conjunction with  the patient's Pap test results.  Please refer to ASCCP current guidelines for  the use of HPV DNA testing, result interpretation, and patient management.   The performance of this test was verified by the Molecular Diagnostic  Laboratory at [facility]. The lab is  certified under the Clinical Laboratory Amendments of 1988 (CLIA 88) as  qualified to perform high  complexity clinical laboratory testing.    This specimen has been analyzed by the ThinPrep Imaging System ([employer]  an automated imaging and review system, which assists the laboratory in  evaluating cells on ThinPrep Pap tests. Following automated imaging, selected  fields from every slide were reviewed by a cytotechnologist and/or pathologist.      CONVERTED FINAL REPORT PDF LINK TO* 01/25/2022 \\copathshare\copath\PDF 2022_[id number]   Final     Current Outpatient Medications on File Prior to Visit   Medication Sig Dispense Refill    aspirin 81 mg EC tablet Take 1 tablet (81 mg) by mouth once daily.      atorvastatin (Lipitor) 40 mg tablet TAKE 1 TABLET BY MOUTH DAILY 90 tablet 0    calcium carbonate (Oscal) 500 mg calcium (1,250 mg) tablet Take 1 tablet (1,250 mg) by mouth 2 times a day with meals.      fluticasone (Flonase) 50 mcg/actuation nasal spray Administer 1 spray into each nostril once daily. Shake gently. Before first use, prime pump. After use, clean tip and replace cap. 16 g 0    L.rhamnosus/B.animalis,lactis, ([employer] ORAL) Take 1 tablet by mouth once daily.      loratadine (Claritin) 10 mg tablet Take 1 tablet (10 mg) by mouth once daily.      multivitamin-min-iron-FA-vit K (Multi For Her) 18 mg iron-600 mcg-40 mcg capsule Take 1 capsule by mouth once daily.      omega-3s-dha-epa-fish oil-D3 (Fish Oil-Vit D3) 360 mg-1,200 mg -1,000 unit capsule Take 1 capsule by mouth once daily.       No current facility-administered medications on file prior to visit.     No images are attached to the encounter.            Assessment/Plan   Diagnoses and all orders for this visit:  Elevated blood pressure reading without diagnosis of hypertension  Acute bronchitis, unspecified organism  -     amoxicillin (Amoxil) 875 mg tablet; Take 1 tablet (875 mg) by mouth 2 times a day for 10 days.    Patient to continue on no salt diet, dash diet  2.   Patient to call for questions or  concerns

## 2024-10-02 DIAGNOSIS — E78.1 HYPERTRIGLYCERIDEMIA: ICD-10-CM

## 2024-10-02 RX ORDER — ATORVASTATIN CALCIUM 40 MG/1
40 TABLET, FILM COATED ORAL DAILY
Qty: 90 TABLET | Refills: 3 | Status: SHIPPED | OUTPATIENT
Start: 2024-10-02

## 2025-03-04 ENCOUNTER — TELEPHONE (OUTPATIENT)
Dept: PRIMARY CARE | Facility: CLINIC | Age: 65
End: 2025-03-04
Payer: COMMERCIAL

## 2025-03-04 DIAGNOSIS — Z12.31 ENCOUNTER FOR SCREENING MAMMOGRAM FOR MALIGNANT NEOPLASM OF BREAST: ICD-10-CM
